# Patient Record
Sex: MALE | Race: WHITE | NOT HISPANIC OR LATINO
[De-identification: names, ages, dates, MRNs, and addresses within clinical notes are randomized per-mention and may not be internally consistent; named-entity substitution may affect disease eponyms.]

---

## 2018-02-13 ENCOUNTER — TRANSCRIPTION ENCOUNTER (OUTPATIENT)
Age: 73
End: 2018-02-13

## 2018-02-13 NOTE — ASU PATIENT PROFILE, ADULT - TOBACCO USE
INDICATION: Left foot injury     



COMPARISON: None



TECHNIQUE: AP, lateral, and oblique views were obtained.



FINDINGS: The bony structures, joint spaces, and soft tissues are normal for age.



IMPRESSION: NEGATIVE EXAMINATION.
Never smoker

## 2018-02-13 NOTE — ASU PATIENT PROFILE, ADULT - PMH
Afib    Atrial flutter    BPH (benign prostatic hyperplasia)    Edema of lower extremity    MR (mental retardation)    Seizure    Varicose veins

## 2018-02-14 ENCOUNTER — OUTPATIENT (OUTPATIENT)
Dept: OUTPATIENT SERVICES | Facility: HOSPITAL | Age: 73
LOS: 1 days | End: 2018-02-14
Payer: MEDICARE

## 2018-02-14 VITALS
TEMPERATURE: 98 F | HEIGHT: 70 IN | DIASTOLIC BLOOD PRESSURE: 69 MMHG | HEART RATE: 81 BPM | OXYGEN SATURATION: 97 % | RESPIRATION RATE: 15 BRPM | SYSTOLIC BLOOD PRESSURE: 124 MMHG | WEIGHT: 179.02 LBS

## 2018-02-14 VITALS
SYSTOLIC BLOOD PRESSURE: 112 MMHG | RESPIRATION RATE: 22 BRPM | HEART RATE: 78 BPM | DIASTOLIC BLOOD PRESSURE: 50 MMHG | OXYGEN SATURATION: 99 %

## 2018-02-14 DIAGNOSIS — H25.11 AGE-RELATED NUCLEAR CATARACT, RIGHT EYE: ICD-10-CM

## 2018-02-14 DIAGNOSIS — Z98.890 OTHER SPECIFIED POSTPROCEDURAL STATES: Chronic | ICD-10-CM

## 2018-02-14 PROCEDURE — V2632: CPT

## 2018-02-14 PROCEDURE — C1889: CPT

## 2018-02-14 PROCEDURE — 66982 XCAPSL CTRC RMVL CPLX WO ECP: CPT | Mod: RT

## 2018-02-20 PROBLEM — I83.90 ASYMPTOMATIC VARICOSE VEINS OF UNSPECIFIED LOWER EXTREMITY: Chronic | Status: ACTIVE | Noted: 2018-02-13

## 2018-02-20 PROBLEM — R56.9 UNSPECIFIED CONVULSIONS: Chronic | Status: ACTIVE | Noted: 2018-02-13

## 2018-02-20 PROBLEM — F79 UNSPECIFIED INTELLECTUAL DISABILITIES: Chronic | Status: ACTIVE | Noted: 2018-02-13

## 2018-02-20 PROBLEM — I48.91 UNSPECIFIED ATRIAL FIBRILLATION: Chronic | Status: ACTIVE | Noted: 2018-02-13

## 2018-02-20 PROBLEM — I48.92 UNSPECIFIED ATRIAL FLUTTER: Chronic | Status: ACTIVE | Noted: 2018-02-13

## 2018-02-20 PROBLEM — R60.0 LOCALIZED EDEMA: Chronic | Status: ACTIVE | Noted: 2018-02-13

## 2018-02-20 PROBLEM — N40.0 BENIGN PROSTATIC HYPERPLASIA WITHOUT LOWER URINARY TRACT SYMPTOMS: Chronic | Status: ACTIVE | Noted: 2018-02-13

## 2018-02-27 ENCOUNTER — TRANSCRIPTION ENCOUNTER (OUTPATIENT)
Age: 73
End: 2018-02-27

## 2018-02-28 ENCOUNTER — OUTPATIENT (OUTPATIENT)
Dept: OUTPATIENT SERVICES | Facility: HOSPITAL | Age: 73
LOS: 1 days | End: 2018-02-28
Payer: MEDICARE

## 2018-02-28 VITALS
SYSTOLIC BLOOD PRESSURE: 114 MMHG | OXYGEN SATURATION: 98 % | WEIGHT: 178.57 LBS | RESPIRATION RATE: 13 BRPM | TEMPERATURE: 98 F | HEART RATE: 78 BPM | HEIGHT: 68 IN | DIASTOLIC BLOOD PRESSURE: 80 MMHG

## 2018-02-28 VITALS
OXYGEN SATURATION: 100 % | SYSTOLIC BLOOD PRESSURE: 110 MMHG | RESPIRATION RATE: 18 BRPM | DIASTOLIC BLOOD PRESSURE: 82 MMHG | HEART RATE: 75 BPM

## 2018-02-28 DIAGNOSIS — H25.11 AGE-RELATED NUCLEAR CATARACT, RIGHT EYE: ICD-10-CM

## 2018-02-28 DIAGNOSIS — H25.12 AGE-RELATED NUCLEAR CATARACT, LEFT EYE: ICD-10-CM

## 2018-02-28 DIAGNOSIS — Z98.41 CATARACT EXTRACTION STATUS, RIGHT EYE: Chronic | ICD-10-CM

## 2018-02-28 DIAGNOSIS — Z98.890 OTHER SPECIFIED POSTPROCEDURAL STATES: Chronic | ICD-10-CM

## 2018-02-28 PROCEDURE — 66982 XCAPSL CTRC RMVL CPLX WO ECP: CPT | Mod: LT

## 2018-02-28 PROCEDURE — V2632: CPT

## 2018-02-28 PROCEDURE — C1889: CPT

## 2018-02-28 NOTE — ASU DISCHARGE PLAN (ADULT/PEDIATRIC). - PT EDUC
Intraocular lens implant (IOL) Eye shield with instructions , sunglasses and eye kit given to patient./Implant card (specify)/other (specify)

## 2018-07-13 ENCOUNTER — EMERGENCY (EMERGENCY)
Facility: HOSPITAL | Age: 73
LOS: 1 days | Discharge: HOME | End: 2018-07-13
Attending: EMERGENCY MEDICINE
Payer: MEDICARE

## 2018-07-13 VITALS
OXYGEN SATURATION: 96 % | TEMPERATURE: 98 F | DIASTOLIC BLOOD PRESSURE: 80 MMHG | HEART RATE: 77 BPM | RESPIRATION RATE: 18 BRPM | SYSTOLIC BLOOD PRESSURE: 122 MMHG

## 2018-07-13 VITALS
DIASTOLIC BLOOD PRESSURE: 84 MMHG | TEMPERATURE: 98 F | WEIGHT: 182.1 LBS | HEART RATE: 84 BPM | HEIGHT: 67 IN | RESPIRATION RATE: 18 BRPM | OXYGEN SATURATION: 95 % | SYSTOLIC BLOOD PRESSURE: 134 MMHG

## 2018-07-13 DIAGNOSIS — Z98.890 OTHER SPECIFIED POSTPROCEDURAL STATES: Chronic | ICD-10-CM

## 2018-07-13 DIAGNOSIS — Z98.41 CATARACT EXTRACTION STATUS, RIGHT EYE: Chronic | ICD-10-CM

## 2018-07-13 LAB
ALBUMIN SERPL ELPH-MCNC: 4.2 G/DL — SIGNIFICANT CHANGE UP (ref 3.3–5)
ALP SERPL-CCNC: 83 U/L — SIGNIFICANT CHANGE UP (ref 40–120)
ALT FLD-CCNC: 17 U/L — SIGNIFICANT CHANGE UP (ref 10–45)
ANION GAP SERPL CALC-SCNC: 13 MMOL/L — SIGNIFICANT CHANGE UP (ref 5–17)
APTT BLD: 33.3 SEC — SIGNIFICANT CHANGE UP (ref 27.5–37.4)
AST SERPL-CCNC: 18 U/L — SIGNIFICANT CHANGE UP (ref 10–40)
BASOPHILS # BLD AUTO: 0 K/UL — SIGNIFICANT CHANGE UP (ref 0–0.2)
BASOPHILS NFR BLD AUTO: 0.7 % — SIGNIFICANT CHANGE UP (ref 0–2)
BILIRUB SERPL-MCNC: 0.3 MG/DL — SIGNIFICANT CHANGE UP (ref 0.2–1.2)
BUN SERPL-MCNC: 23 MG/DL — SIGNIFICANT CHANGE UP (ref 7–23)
CALCIUM SERPL-MCNC: 9.3 MG/DL — SIGNIFICANT CHANGE UP (ref 8.4–10.5)
CHLORIDE SERPL-SCNC: 104 MMOL/L — SIGNIFICANT CHANGE UP (ref 96–108)
CO2 SERPL-SCNC: 23 MMOL/L — SIGNIFICANT CHANGE UP (ref 22–31)
CREAT SERPL-MCNC: 0.97 MG/DL — SIGNIFICANT CHANGE UP (ref 0.5–1.3)
EOSINOPHIL # BLD AUTO: 0 K/UL — SIGNIFICANT CHANGE UP (ref 0–0.5)
EOSINOPHIL NFR BLD AUTO: 0.2 % — SIGNIFICANT CHANGE UP (ref 0–6)
GLUCOSE SERPL-MCNC: 89 MG/DL — SIGNIFICANT CHANGE UP (ref 70–99)
HCT VFR BLD CALC: 44.1 % — SIGNIFICANT CHANGE UP (ref 39–50)
HGB BLD-MCNC: 14.9 G/DL — SIGNIFICANT CHANGE UP (ref 13–17)
INR BLD: 1.15 RATIO — SIGNIFICANT CHANGE UP (ref 0.88–1.16)
LYMPHOCYTES # BLD AUTO: 1.5 K/UL — SIGNIFICANT CHANGE UP (ref 1–3.3)
LYMPHOCYTES # BLD AUTO: 22 % — SIGNIFICANT CHANGE UP (ref 13–44)
MCHC RBC-ENTMCNC: 33.8 GM/DL — SIGNIFICANT CHANGE UP (ref 32–36)
MCHC RBC-ENTMCNC: 34.2 PG — HIGH (ref 27–34)
MCV RBC AUTO: 101 FL — HIGH (ref 80–100)
MONOCYTES # BLD AUTO: 0.6 K/UL — SIGNIFICANT CHANGE UP (ref 0–0.9)
MONOCYTES NFR BLD AUTO: 9.2 % — SIGNIFICANT CHANGE UP (ref 2–14)
NEUTROPHILS # BLD AUTO: 4.5 K/UL — SIGNIFICANT CHANGE UP (ref 1.8–7.4)
NEUTROPHILS NFR BLD AUTO: 67.9 % — SIGNIFICANT CHANGE UP (ref 43–77)
PLATELET # BLD AUTO: 178 K/UL — SIGNIFICANT CHANGE UP (ref 150–400)
POTASSIUM SERPL-MCNC: 4.1 MMOL/L — SIGNIFICANT CHANGE UP (ref 3.5–5.3)
POTASSIUM SERPL-SCNC: 4.1 MMOL/L — SIGNIFICANT CHANGE UP (ref 3.5–5.3)
PROT SERPL-MCNC: 7.1 G/DL — SIGNIFICANT CHANGE UP (ref 6–8.3)
PROTHROM AB SERPL-ACNC: 12.5 SEC — SIGNIFICANT CHANGE UP (ref 9.8–12.7)
RBC # BLD: 4.37 M/UL — SIGNIFICANT CHANGE UP (ref 4.2–5.8)
RBC # FLD: 12.3 % — SIGNIFICANT CHANGE UP (ref 10.3–14.5)
SODIUM SERPL-SCNC: 140 MMOL/L — SIGNIFICANT CHANGE UP (ref 135–145)
WBC # BLD: 6.6 K/UL — SIGNIFICANT CHANGE UP (ref 3.8–10.5)
WBC # FLD AUTO: 6.6 K/UL — SIGNIFICANT CHANGE UP (ref 3.8–10.5)

## 2018-07-13 PROCEDURE — 73590 X-RAY EXAM OF LOWER LEG: CPT

## 2018-07-13 PROCEDURE — 99284 EMERGENCY DEPT VISIT MOD MDM: CPT | Mod: GC

## 2018-07-13 PROCEDURE — 80053 COMPREHEN METABOLIC PANEL: CPT

## 2018-07-13 PROCEDURE — 73590 X-RAY EXAM OF LOWER LEG: CPT | Mod: 26,LT

## 2018-07-13 PROCEDURE — 99284 EMERGENCY DEPT VISIT MOD MDM: CPT | Mod: 25

## 2018-07-13 PROCEDURE — 85027 COMPLETE CBC AUTOMATED: CPT

## 2018-07-13 PROCEDURE — 85610 PROTHROMBIN TIME: CPT

## 2018-07-13 PROCEDURE — 93971 EXTREMITY STUDY: CPT | Mod: 26

## 2018-07-13 PROCEDURE — 73610 X-RAY EXAM OF ANKLE: CPT | Mod: 26,LT

## 2018-07-13 PROCEDURE — 85730 THROMBOPLASTIN TIME PARTIAL: CPT

## 2018-07-13 PROCEDURE — 93971 EXTREMITY STUDY: CPT

## 2018-07-13 PROCEDURE — 73610 X-RAY EXAM OF ANKLE: CPT

## 2018-07-13 RX ORDER — FONDAPARINUX SODIUM 2.5 MG/.5ML
1 INJECTION, SOLUTION SUBCUTANEOUS
Qty: 42 | Refills: 0 | OUTPATIENT
Start: 2018-07-13 | End: 2018-08-02

## 2018-07-13 NOTE — ED PROVIDER NOTE - MEDICAL DECISION MAKING DETAILS
72M PMH developmental delay p/w LLE pain and swelling. Concerning for fx vs DVT. Xrays, doppler. Re-assess.

## 2018-07-13 NOTE — ED PROVIDER NOTE - OBJECTIVE STATEMENT
73yo M h/o seizures on phenytoin, afib on dilatiazem, pradaxa, digoxin, developmental delay sent in for r/o LLE DVT. Per aide, had acute swelling and pain of LLE that started yesterday . Denies f/c, cp/sob, trauma, bites to the area. Has been able to ambulate without issues. Saw PMD who told him to go to [facility] for US. Came here as [facility]'  department was closed until 7am.

## 2018-07-13 NOTE — ED PROVIDER NOTE - PLAN OF CARE
You were found to have a clot in one of the deep veins in your leg. We have switched your anticoagulant to Xarelto from Pradaxa. You need to take one pill two times per day for 21 days with food and then can switch to 20mg once daily with food after 21 weeks. Please see Dr. Bergeron in the next week and then again to get the prescription for the once daily. Please start today at 8pm.  If ou start experiencing shortness of breath, chest pain, worsening swelling, or any other emergent concern please come back to the emergency room. Please take advil 400mg every 6 hours with food for pain and keep the leg elevated.

## 2018-07-13 NOTE — ED PROVIDER NOTE - ATTENDING CONTRIBUTION TO CARE
72 yom hx of known seizure d/o on phenytoin, a fib on dilt and pradaxa longstanding, dig, developmental delay living in a group home in [place], presents for left leg swelling starting yesterday. pt was sent to [facility] initially, however pt and his aide from the group home were told the US department was closed until the AM, so they left and came to [facility] instead. aide states pt sustained a minor ground level fall approx one week ago w abrasions to left knee and shin which pt has not been c/o about. as per aide, LLE swelling to ankle and distal leg started yesterday w pain. no pain to left ankle joint. no f/c. pt unable to provide significant hx.     ROS:   constitutional - no fever, no chills  eyes - no visual changes, no redness  eent - no sore throat, no nasal congestion  cvs - no chest pain, + leg swelling  resp - no shortness of breath, no cough  gi - no abdominal pain, no vomiting, no diarrhea  gu - no dysuria, no hematuria  msk - no acute back pain, no joint swelling  skin - no rashes, no jaundice  neuro - no headache, no focal weakness  psych - no acute mental health issue     Physical Exam:   constitutional - well appearing, awake and alert, oriented x2, is at baseline mental status per aide at the bedside who knows him well. pt is very pleasant, smiling, able to answer yes/ no questions and follow all commands   head - no external evidence of trauma  cvs - rrr, no murmurs, no peripheral edema  resp - breath sounds clear and equal bilat  gi - abdomen soft and nontender, no rigidity, guarding or rebound, bowel sounds present  msk - moving all extremities spontaneously. LLE discolored, 1+ edema, significantly more swollen than right. no ankle joint warmth or erythema - painles rom to joint. healing abrasions to left knee and few to shin - no pain w rom to left knee.   neuro - alert and oriented x2, no focal deficits, CNs 2-12 grossly intact  skin- no jaundice, warm and dry  psych - mood and affect wnl, no apparent risk to self or others     pt found to have distal posterior vein dvt though study not optimal - pt is currently on pradaxa and despite this has acute symptomatic dvt. will attempt to get in touch w pt's cardiologist and/or pmd to discuss plan (poss switch to other noac such as xarelto vs lovenox) for unprovoked dvt already on pradaxa. pt does not have decisional capacity to understand r/b/a of starting/ switching to new AC - will discuss w pt's HCP/brother. endorsed to Dr Pena at 0730 pending discussion w pmd and/or cards as well as HCP. SHUN Pickett MD

## 2018-07-13 NOTE — ED PROVIDER NOTE - CARE PLAN
Principal Discharge DX:	DVT (deep venous thrombosis) Principal Discharge DX:	DVT (deep venous thrombosis)  Assessment and plan of treatment:	You were found to have a clot in one of the deep veins in your leg. We have switched your anticoagulant to Xarelto from Pradaxa. You need to take one pill two times per day for 21 days with food and then can switch to 20mg once daily with food after 21 weeks. Please see Dr. Bergeron in the next week and then again to get the prescription for the once daily. Please start today at 8pm.  If ou start experiencing shortness of breath, chest pain, worsening swelling, or any other emergent concern please come back to the emergency room. Please take advil 400mg every 6 hours with food for pain and keep the leg elevated.

## 2018-07-13 NOTE — ED PROVIDER NOTE - PHYSICAL EXAMINATION
Vitals: WNL  Gen: laying comfortably in NAD  Head: NCAT  ENT: sclerae white, anicterus, moist mucous membranes. No exudates. Neck supple, no LAD,  no carotid bruits, no JVD  CV: RRR. Audible S1 and S2. No murmurs, rubs, gallops, S3, nor S4, 2+ radial and DP pulses   Pulm: Clear to auscultation bilaterally. No wheezes, rales, or rhonchi  Abd: soft, normoactive BS x4, NTND, no rebound, no guarding, no rashes  Musculoskeletal:  swelling left ankle and calf, mild erythema LLE, 2+ pedal pulses, sensation/motor b/l LE intact  Skin: no lesions or scars noted  Neurologic: AAOx3  : no CVA tenderness  Psych: no SI/HI

## 2018-07-13 NOTE — ED PROVIDER NOTE - PROGRESS NOTE DETAILS
Anita: Left message for Dr. Moss [phone]. Will call patient's cardiologist, Dr. Stephens [phone] Anita: Left a message for Dr. Stephens again. Anita: Cardiologist still has not called back. Spoke to Dr. Moss. Will switch to xarelto. She agrees with plan. Also spoke to patient's brother, Zi [phone]. He is aware of the plan and agrees with it.

## 2018-07-13 NOTE — ED ADULT NURSE NOTE - OBJECTIVE STATEMENT
72 y.o M w. PMH of clarissa MATUTE, a-fib came to the ER w. c/o LLE swelling and redness, sent here to r/u for a DVT by PMD. Pt denies any CP, SOB, n/v, fever, chills, h/a, dizziness, weakness. Pt is axox4, lungs CTA, + redness and sweling in LLE, +bs abdomen soft non-distended, +central pulses, ambulating w. steady gait, safety and comfort maintained, no acute distress noted at this time.

## 2018-11-06 NOTE — ASU DISCHARGE PLAN (ADULT/PEDIATRIC). - ITEMS TO FOLLOWUP WITH YOUR PHYSICIAN'S
continue present treatment as per psych plan as reviewed  Medically stable with no new changes in treatment  will continue to monitor medical status while being treated on psych   medically stable for d/c as per psych Eye kit and eye drops. Stable

## 2019-01-04 PROBLEM — Z00.00 ENCOUNTER FOR PREVENTIVE HEALTH EXAMINATION: Status: ACTIVE | Noted: 2019-01-04

## 2019-01-07 ENCOUNTER — APPOINTMENT (OUTPATIENT)
Dept: VASCULAR SURGERY | Facility: CLINIC | Age: 74
End: 2019-01-07

## 2019-01-22 ENCOUNTER — APPOINTMENT (OUTPATIENT)
Dept: VASCULAR SURGERY | Facility: CLINIC | Age: 74
End: 2019-01-22

## 2019-01-22 ENCOUNTER — APPOINTMENT (OUTPATIENT)
Dept: VASCULAR SURGERY | Facility: CLINIC | Age: 74
End: 2019-01-22
Payer: MEDICARE

## 2019-01-22 VITALS
TEMPERATURE: 97.5 F | SYSTOLIC BLOOD PRESSURE: 114 MMHG | BODY MASS INDEX: 28.79 KG/M2 | HEIGHT: 68 IN | DIASTOLIC BLOOD PRESSURE: 77 MMHG | HEART RATE: 72 BPM | WEIGHT: 190 LBS

## 2019-01-22 PROCEDURE — 99203 OFFICE O/P NEW LOW 30 MIN: CPT

## 2019-01-22 PROCEDURE — 93970 EXTREMITY STUDY: CPT

## 2019-01-22 NOTE — ASSESSMENT
[FreeTextEntry1] : In summary, Mr. Guthrie presents with a history of left lower extremity DVT. I have provided him a prescription for medical-grade, knee-high compression stockings. I recommend evaluation by Hematology for hypercoagulable workup for unprovoked DVT. He will return to follow up with me in one year.\par \par Thank you for allowing me to participate in the care of this nice man. Please do not hesitate to call with any questions.\par   \par

## 2019-01-22 NOTE — HISTORY OF PRESENT ILLNESS
[FreeTextEntry1] : I have just had the pleasure of seeing Mr. Ever Guthrie ( 41) in consultation for left lower extremity deep vein thrombosis.\par \par Mr. Guthrie is a 73-year-old gentleman with a history of developmental delay who is accompanied by his aide today. He resides in a group home. History is obtained from the patient, aide, and EMR. \par \par Mr. Guthrie presented to the [facility] ED in July of this year with a one week history of leg edema. A venous duplex demonstrated a left posterior tibial vein DVT. He was on Predaxa for atrial fibrillation at the time, which was switched to Xarelto by his cardiologist Dr. Bergeron. He continues to take Xarelto daily as prescribed. The DVT was deemed to be unprovoked. He continues to experience some left leg edema. There is no known history of skin pigmentation changes, open or healed ulcers, lower extremity claudication, rest pain, or tissue loss.\par \par His medical and surgical history is otherwise significant for seizure disorder, bilateral cataract surgery, right inguinal hernia repair, atrial fibrillation, and BPH\par \par Medications: Lipitor, Digoxin, Diltiazem, Dutasteride, Phenytoin, Flomax, Xarelto, Zonisamide\par \par Allergies: NKDA\par \par Social history: Non-smoker\par \par FH: NC\par

## 2019-01-22 NOTE — PHYSICAL EXAM
[de-identified] : On physical examination the patient is NAD, NCAT. HEENT- WNL. The lungs are clear and the heart has an irregular rate and rhythm. The abdomen is soft, without tenderness or pulsatile mass. Non-pitting left leg edema. No open wounds. Decreased pedal pulses.\par \par A lower extremity venous duplex ultrasound was obtained in the office today, which showed:\par -No evidence of DVT, SVT\par

## 2019-01-28 ENCOUNTER — APPOINTMENT (OUTPATIENT)
Dept: VASCULAR SURGERY | Facility: CLINIC | Age: 74
End: 2019-01-28

## 2019-02-19 ENCOUNTER — EMERGENCY (EMERGENCY)
Facility: HOSPITAL | Age: 74
LOS: 1 days | Discharge: HOME | End: 2019-02-19
Attending: EMERGENCY MEDICINE
Payer: MEDICARE

## 2019-02-19 VITALS
DIASTOLIC BLOOD PRESSURE: 79 MMHG | OXYGEN SATURATION: 100 % | RESPIRATION RATE: 17 BRPM | HEART RATE: 78 BPM | TEMPERATURE: 98 F | SYSTOLIC BLOOD PRESSURE: 128 MMHG

## 2019-02-19 VITALS
TEMPERATURE: 98 F | OXYGEN SATURATION: 97 % | DIASTOLIC BLOOD PRESSURE: 69 MMHG | RESPIRATION RATE: 18 BRPM | SYSTOLIC BLOOD PRESSURE: 110 MMHG | HEART RATE: 87 BPM

## 2019-02-19 DIAGNOSIS — Z98.41 CATARACT EXTRACTION STATUS, RIGHT EYE: Chronic | ICD-10-CM

## 2019-02-19 DIAGNOSIS — Z98.890 OTHER SPECIFIED POSTPROCEDURAL STATES: Chronic | ICD-10-CM

## 2019-02-19 PROCEDURE — 70486 CT MAXILLOFACIAL W/O DYE: CPT | Mod: 26

## 2019-02-19 PROCEDURE — 82962 GLUCOSE BLOOD TEST: CPT

## 2019-02-19 PROCEDURE — 76377 3D RENDER W/INTRP POSTPROCES: CPT

## 2019-02-19 PROCEDURE — 76377 3D RENDER W/INTRP POSTPROCES: CPT | Mod: 26

## 2019-02-19 PROCEDURE — 12013 RPR F/E/E/N/L/M 2.6-5.0 CM: CPT

## 2019-02-19 PROCEDURE — 90471 IMMUNIZATION ADMIN: CPT

## 2019-02-19 PROCEDURE — 99285 EMERGENCY DEPT VISIT HI MDM: CPT | Mod: 25

## 2019-02-19 PROCEDURE — 99284 EMERGENCY DEPT VISIT MOD MDM: CPT | Mod: 25,GC

## 2019-02-19 PROCEDURE — 70450 CT HEAD/BRAIN W/O DYE: CPT | Mod: 26

## 2019-02-19 PROCEDURE — 70450 CT HEAD/BRAIN W/O DYE: CPT

## 2019-02-19 PROCEDURE — 70486 CT MAXILLOFACIAL W/O DYE: CPT

## 2019-02-19 PROCEDURE — 90715 TDAP VACCINE 7 YRS/> IM: CPT

## 2019-02-19 PROCEDURE — 12013 RPR F/E/E/N/L/M 2.6-5.0 CM: CPT | Mod: GC

## 2019-02-19 RX ORDER — ACETAMINOPHEN 500 MG
2 TABLET ORAL
Qty: 40 | Refills: 0 | OUTPATIENT
Start: 2019-02-19 | End: 2019-02-23

## 2019-02-19 RX ORDER — ACETAMINOPHEN 500 MG
975 TABLET ORAL ONCE
Qty: 0 | Refills: 0 | Status: COMPLETED | OUTPATIENT
Start: 2019-02-19 | End: 2019-02-19

## 2019-02-19 RX ORDER — TETANUS TOXOID, REDUCED DIPHTHERIA TOXOID AND ACELLULAR PERTUSSIS VACCINE, ADSORBED 5; 2.5; 8; 8; 2.5 [IU]/.5ML; [IU]/.5ML; UG/.5ML; UG/.5ML; UG/.5ML
0.5 SUSPENSION INTRAMUSCULAR ONCE
Qty: 0 | Refills: 0 | Status: COMPLETED | OUTPATIENT
Start: 2019-02-19 | End: 2019-02-19

## 2019-02-19 RX ADMIN — Medication 975 MILLIGRAM(S): at 16:15

## 2019-02-19 RX ADMIN — Medication 1 TABLET(S): at 14:10

## 2019-02-19 RX ADMIN — TETANUS TOXOID, REDUCED DIPHTHERIA TOXOID AND ACELLULAR PERTUSSIS VACCINE, ADSORBED 0.5 MILLILITER(S): 5; 2.5; 8; 8; 2.5 SUSPENSION INTRAMUSCULAR at 13:10

## 2019-02-19 NOTE — ED PROVIDER NOTE - ATTENDING CONTRIBUTION TO CARE
Patient s/p mechanical fall with facial injury, no LOC.  On Xarelto.  Reporting pain in nose.  No other complaints.  On exam patient well appearing, at baseline mental status per aide, small laceration to R eyebrow and over nasal bridge, neuro exam grossly intact - plan for screening CT head/maxillofacial for injury.

## 2019-02-19 NOTE — ED PROVIDER NOTE - CARE PROVIDER_API CALL
Bisi Mahan (MD)  Plastic Surgery  [address] [place] [address], [address]  [place], [place] [id number]  Phone: [phone]  Fax: [phone]  Follow Up Time:

## 2019-02-19 NOTE — ED PROVIDER NOTE - PHYSICAL EXAMINATION
Resident:   Gen: well appearing, of stated age, no acute distress  Head: NC, AT  ENT: PERRL, MMM  Neck: supple with full ROM   Chest: CTAB, no retractions, rate normal, appears to breathe comfortably  Heart: RRR S1S2, No peripheral edema, bilateral pulses in arms and legs  Abd: Soft non-tender, no rebound or guarding  Back: No spinal deformity, no CVAT  Ext: Moving all 4 extremities without obvious impairment to ROM, no obvious weakness  Neuro: fluid speech, no focal deficits, normal sensation  Psych: repetitive speech  Skin: 2cm linear laceration over right eyebrow, oozing; 1cm linear laceration over nasal bridge, oozing.

## 2019-02-19 NOTE — ED PROVIDER NOTE - CLINICAL SUMMARY MEDICAL DECISION MAKING FREE TEXT BOX
Resident: 73y M with MR, seizures, Afib on anticoagulation presents with witnessed mechanical fall. No seizure activity reported. vitals wnl. non-focal exam. Will obtain CT head, CT maxface, repair lacerations.

## 2019-02-19 NOTE — ED PROVIDER NOTE - PROGRESS NOTE DETAILS
lacerations repaired. Will dc home with wound care instructions, nasal fracture instructions, plastics follow-up, augmentin.

## 2019-02-19 NOTE — ED PROCEDURE NOTE - PROCEDURE ADDITIONAL DETAILS
3, 5.0 Ethilon simple interrupted sutures applied to R eye brow laceration.    4, 5.0 Ethilon simple interrupted sutures applied to nasal bridge laceration.    Wound anesthetized with 1% lidocaine. Copious Irrigation without evidence of foreign body.

## 2019-02-19 NOTE — ED PROVIDER NOTE - NSFOLLOWUPINSTRUCTIONS_ED_ALL_ED_FT
1. nasal fracture, lacerations  2. Take augmentin as directed. Do not blow your nose. It is normal to have a small amount of blood drain from nose, and to have red tinged tears. Keep sutured wounds clean and dry for 24hours, then treat the area as regular skin. Apply a small amount of bacitracin to the area 1-2times per day.   3. Return to the ED or your primary care doctor for suture removal in 5 days. Follow-up with plastic surgery in 3-7 days for nasal fracture.  4. Return immediately to the emergency department if any worsening or concerning symptoms. 1. nasal fracture, lacerations  2. Take augmentin as directed. Do not blow your nose. It is normal to have a small amount of blood drain from nose, and to have red tinged tears. Keep sutured wounds clean and dry for 24hours, then treat the area as regular skin. Apply a small amount of bacitracin to the area 1-2times per day.   3. Return to the ED or your primary care doctor for suture removal in 5 days - we have provided a suture removal kit for this use. Follow-up with plastic surgery in 3-7 days for nasal fracture.  4. Return immediately to the emergency department if any worsening or concerning symptoms.

## 2019-02-19 NOTE — ED ADULT NURSE REASSESSMENT NOTE - NS ED NURSE REASSESS COMMENT FT1
Pt aware we are waiting for CT results. No active bleeding from laceration currently. Safety and comfort measures provided. Home health Aide at bedside. Side rails up for safety.

## 2019-02-19 NOTE — ED ADULT NURSE NOTE - INTERVENTIONS DEFINITIONS
Monitor for mental status changes and reorient to person, place, and time/Physically safe environment: no spills, clutter or unnecessary equipment/[place] to call system

## 2019-02-19 NOTE — ED PROVIDER NOTE - OBJECTIVE STATEMENT
Resident: 73y M PMH MR, seizures, Afib on xarelto presents with witnessed ground level fall. Accompanied by aide. Patient was ambulating in [employer] with his aide when he "tripped over his feet," hitting his head. Per the aide, patient did not lose consciousness, was able to sit up after falling but has not walked since falling. Per patient, denies preceding dizziness or lightheadedness.

## 2019-02-19 NOTE — ED ADULT NURSE REASSESSMENT NOTE - NS ED NURSE REASSESS COMMENT FT1
Pupils 3mm PERRL. Equal strength and sensation in all extremities. Neuro reassessment documented in trauma flow sheet found in the chart. Pt reporting slight pain but due to developmental delay pt unable to give valid pain scale. Pt appears comfortable. MD aware. Safety and comfort measures provided. Bed in lowest position. Home health aide at bedside.

## 2019-02-19 NOTE — ED ADULT NURSE REASSESSMENT NOTE - NS ED NURSE REASSESS COMMENT FT1
Pt reports blood drainage from eye. MD at bedside educating pt on fracture in the nose that could cause blood to excrete slightly from the eye socket. Pt and Aide educated and aware of plan of care. Safety and comfort measures provided. Pt denies any pain.

## 2019-05-04 ENCOUNTER — EMERGENCY (EMERGENCY)
Facility: HOSPITAL | Age: 74
LOS: 1 days | Discharge: HOME | End: 2019-05-04
Attending: EMERGENCY MEDICINE
Payer: MEDICARE

## 2019-05-04 VITALS
SYSTOLIC BLOOD PRESSURE: 126 MMHG | OXYGEN SATURATION: 98 % | TEMPERATURE: 98 F | RESPIRATION RATE: 16 BRPM | DIASTOLIC BLOOD PRESSURE: 78 MMHG | WEIGHT: 190.04 LBS | HEART RATE: 77 BPM

## 2019-05-04 DIAGNOSIS — Z98.41 CATARACT EXTRACTION STATUS, RIGHT EYE: Chronic | ICD-10-CM

## 2019-05-04 DIAGNOSIS — Z98.890 OTHER SPECIFIED POSTPROCEDURAL STATES: Chronic | ICD-10-CM

## 2019-05-04 PROCEDURE — 72125 CT NECK SPINE W/O DYE: CPT | Mod: 26

## 2019-05-04 PROCEDURE — 12011 RPR F/E/E/N/L/M 2.5 CM/<: CPT

## 2019-05-04 PROCEDURE — 70450 CT HEAD/BRAIN W/O DYE: CPT | Mod: 26

## 2019-05-04 PROCEDURE — 70450 CT HEAD/BRAIN W/O DYE: CPT

## 2019-05-04 PROCEDURE — 99284 EMERGENCY DEPT VISIT MOD MDM: CPT | Mod: 25

## 2019-05-04 PROCEDURE — 72125 CT NECK SPINE W/O DYE: CPT

## 2019-05-04 NOTE — ED PROVIDER NOTE - PHYSICAL EXAMINATION
Attending note. Patient is alert and in no acute distress. Patient has a 1 cm laceration above the left brow. It was a 3 mm equal reactive. Neck and spine are nontender. Chest nontender. Abdomen is soft and nontender. Pelvis is nontender. Patient is moving all extremities without pain or tenderness. There appears to be no focal neurologic deficit.

## 2019-05-04 NOTE — ED PROVIDER NOTE - CLINICAL SUMMARY MEDICAL DECISION MAKING FREE TEXT BOX
Attending note. She will fall with left forehead laceration. CT scan to rule out bleed. Suture repair.

## 2019-05-04 NOTE — ED PROVIDER NOTE - OBJECTIVE STATEMENT
73yom w/ afib on xarelto, developmental delay and unable to provide accurate history had a witnessed fall at the group home today, believed to have tripped over linen and fell onto his face. No LOC reported, has been ambulatory since. Denies any pain. At his baseline mental status per aide at bedside. Tetanus updated 2/2019. 73yom w/ afib on xarelto, developmental delay and unable to provide accurate history had a witnessed fall at the group home today, believed to have tripped over linen and fell onto his face. No LOC reported, has been ambulatory since. Denies any pain. At his baseline mental status per aide at bedside. Tetanus updated 2/2019.      Attending note. Patient was seen in fast track room #2. Agree with the above. Patient tripped and fell striking his left forehead on the ground and sustained laceration to the left forehead. Patient denies complaint. Patient unable to give reliable history. He denies any other complaints. He has no allergies.

## 2019-05-04 NOTE — ED PROVIDER NOTE - NSFOLLOWUPINSTRUCTIONS_ED_ALL_ED_FT
You were seen in the ER after a fall. CT scan of the head and neck did not reveal any acute injury. A laceration of the face was repaired. Absorbable sutures were placed. These will break down and fall out over time. Keep the applied steristrips clean and dry for at least 48 hours. After that, gently cleanse around the wound but do not vigorously scrub. Monitor for any redness, swelling, warmth, discharge.

## 2019-05-04 NOTE — ED ADULT TRIAGE NOTE - CHIEF COMPLAINT QUOTE
Trip and fall from standing, hit forehead, L eyebrow lac  No LOC Trip and fall from standing, hit forehead, L eyebrow lac.  No LOC.  Hx Epilepsy. Takes Xarelto for A.fib.

## 2019-05-04 NOTE — ED PROVIDER NOTE - SKIN, MLM
Skin normal color for race, warm, dry. 1cm linear laceration to the forehead over the left eye brow, no active bleeding

## 2019-05-04 NOTE — ED ADULT NURSE NOTE - NSIMPLEMENTINTERV_GEN_ALL_ED
Implemented All Fall with Harm Risk Interventions:  [place] to call system. Call bell, personal items and telephone within reach. Instruct patient to call for assistance. Room bathroom lighting operational. Non-slip footwear when patient is off stretcher. Physically safe environment: no spills, clutter or unnecessary equipment. Stretcher in lowest position, wheels locked, appropriate side rails in place. Provide visual cue, wrist band, yellow gown, etc. Monitor gait and stability. Monitor for mental status changes and reorient to person, place, and time. Review medications for side effects contributing to fall risk. Reinforce activity limits and safety measures with patient and family. Provide visual clues: red socks.

## 2019-05-04 NOTE — ED ADULT NURSE NOTE - CHIEF COMPLAINT QUOTE
Trip and fall from standing, hit forehead, L eyebrow lac.  No LOC.  Hx Epilepsy. Takes Xarelto for A.fib.

## 2019-05-04 NOTE — ED ADULT NURSE NOTE - OBJECTIVE STATEMENT
73 year old male at baseline mental status, PMHX of A-fib on Xarelto, Seizure disorder, MR, presents with a mechanical fall this morning with a laceration to the left forehead. Patient accompanied by aid who states patient trip and fell landing on the forehead and did not lose consciousness. Patient's pupils perrla 3mm bilaterally. Patient has equal strength and sensation bilaterally. Patient ambulates without ataxia. Patient unable to answer pertinent positive/negatives at baseline.

## 2019-05-04 NOTE — ED PROVIDER NOTE - ENMT, MLM
Airway patent, Nasal mucosa clear. Mouth with normal mucosa. No facial bone deformities or tenderness

## 2019-05-09 ENCOUNTER — TRANSCRIPTION ENCOUNTER (OUTPATIENT)
Age: 74
End: 2019-05-09

## 2019-07-22 ENCOUNTER — TRANSCRIPTION ENCOUNTER (OUTPATIENT)
Age: 74
End: 2019-07-22

## 2019-09-11 ENCOUNTER — TRANSCRIPTION ENCOUNTER (OUTPATIENT)
Age: 74
End: 2019-09-11

## 2019-10-17 ENCOUNTER — EMERGENCY (EMERGENCY)
Facility: HOSPITAL | Age: 74
LOS: 1 days | End: 2019-10-17
Attending: EMERGENCY MEDICINE
Payer: MEDICARE

## 2019-10-17 VITALS
WEIGHT: 179.9 LBS | TEMPERATURE: 97 F | RESPIRATION RATE: 18 BRPM | HEART RATE: 75 BPM | HEIGHT: 66 IN | DIASTOLIC BLOOD PRESSURE: 78 MMHG | SYSTOLIC BLOOD PRESSURE: 117 MMHG | OXYGEN SATURATION: 100 %

## 2019-10-17 DIAGNOSIS — Z98.890 OTHER SPECIFIED POSTPROCEDURAL STATES: Chronic | ICD-10-CM

## 2019-10-17 DIAGNOSIS — Z98.41 CATARACT EXTRACTION STATUS, RIGHT EYE: Chronic | ICD-10-CM

## 2019-10-17 PROCEDURE — 99284 EMERGENCY DEPT VISIT MOD MDM: CPT | Mod: GC

## 2019-10-17 NOTE — ED ADULT TRIAGE NOTE - CHIEF COMPLAINT QUOTE
Left lower extremity swelling, no pain reported. Pt has hx of DVT, History aided by staff member of home where pt resides

## 2019-10-18 VITALS
TEMPERATURE: 98 F | HEART RATE: 76 BPM | OXYGEN SATURATION: 99 % | DIASTOLIC BLOOD PRESSURE: 78 MMHG | RESPIRATION RATE: 16 BRPM | SYSTOLIC BLOOD PRESSURE: 127 MMHG

## 2019-10-18 LAB
ALBUMIN SERPL ELPH-MCNC: 4.3 G/DL — SIGNIFICANT CHANGE UP (ref 3.3–5)
ALP SERPL-CCNC: 74 U/L — SIGNIFICANT CHANGE UP (ref 40–120)
ALT FLD-CCNC: 24 U/L — SIGNIFICANT CHANGE UP (ref 10–45)
ANION GAP SERPL CALC-SCNC: 12 MMOL/L — SIGNIFICANT CHANGE UP (ref 5–17)
APTT BLD: 34 SEC — SIGNIFICANT CHANGE UP (ref 27.5–36.3)
AST SERPL-CCNC: 18 U/L — SIGNIFICANT CHANGE UP (ref 10–40)
BASOPHILS # BLD AUTO: 0.01 K/UL — SIGNIFICANT CHANGE UP (ref 0–0.2)
BASOPHILS NFR BLD AUTO: 0.2 % — SIGNIFICANT CHANGE UP (ref 0–2)
BILIRUB SERPL-MCNC: 0.2 MG/DL — SIGNIFICANT CHANGE UP (ref 0.2–1.2)
BUN SERPL-MCNC: 24 MG/DL — HIGH (ref 7–23)
CALCIUM SERPL-MCNC: 9.8 MG/DL — SIGNIFICANT CHANGE UP (ref 8.4–10.5)
CHLORIDE SERPL-SCNC: 106 MMOL/L — SIGNIFICANT CHANGE UP (ref 96–108)
CO2 SERPL-SCNC: 21 MMOL/L — LOW (ref 22–31)
CREAT SERPL-MCNC: 0.79 MG/DL — SIGNIFICANT CHANGE UP (ref 0.5–1.3)
EOSINOPHIL # BLD AUTO: 0 K/UL — SIGNIFICANT CHANGE UP (ref 0–0.5)
EOSINOPHIL NFR BLD AUTO: 0 % — SIGNIFICANT CHANGE UP (ref 0–6)
GLUCOSE SERPL-MCNC: 103 MG/DL — HIGH (ref 70–99)
HCT VFR BLD CALC: 39.6 % — SIGNIFICANT CHANGE UP (ref 39–50)
HGB BLD-MCNC: 13.2 G/DL — SIGNIFICANT CHANGE UP (ref 13–17)
INR BLD: 1.22 RATIO — HIGH (ref 0.88–1.16)
LYMPHOCYTES # BLD AUTO: 1.3 K/UL — SIGNIFICANT CHANGE UP (ref 1–3.3)
LYMPHOCYTES # BLD AUTO: 31.5 % — SIGNIFICANT CHANGE UP (ref 13–44)
MCHC RBC-ENTMCNC: 33.3 GM/DL — SIGNIFICANT CHANGE UP (ref 32–36)
MCHC RBC-ENTMCNC: 33.3 PG — SIGNIFICANT CHANGE UP (ref 27–34)
MCV RBC AUTO: 100 FL — SIGNIFICANT CHANGE UP (ref 80–100)
MONOCYTES # BLD AUTO: 0.47 K/UL — SIGNIFICANT CHANGE UP (ref 0–0.9)
MONOCYTES NFR BLD AUTO: 11.4 % — SIGNIFICANT CHANGE UP (ref 2–14)
NEUTROPHILS # BLD AUTO: 2.35 K/UL — SIGNIFICANT CHANGE UP (ref 1.8–7.4)
NEUTROPHILS NFR BLD AUTO: 56.9 % — SIGNIFICANT CHANGE UP (ref 43–77)
NRBC # BLD: 0 /100 WBCS — SIGNIFICANT CHANGE UP (ref 0–0)
PLATELET # BLD AUTO: 189 K/UL — SIGNIFICANT CHANGE UP (ref 150–400)
POTASSIUM SERPL-MCNC: 4.5 MMOL/L — SIGNIFICANT CHANGE UP (ref 3.5–5.3)
POTASSIUM SERPL-SCNC: 4.5 MMOL/L — SIGNIFICANT CHANGE UP (ref 3.5–5.3)
PROT SERPL-MCNC: 6.7 G/DL — SIGNIFICANT CHANGE UP (ref 6–8.3)
PROTHROM AB SERPL-ACNC: 14 SEC — HIGH (ref 10–12.9)
RBC # BLD: 3.96 M/UL — LOW (ref 4.2–5.8)
RBC # FLD: 13.5 % — SIGNIFICANT CHANGE UP (ref 10.3–14.5)
SODIUM SERPL-SCNC: 139 MMOL/L — SIGNIFICANT CHANGE UP (ref 135–145)
WBC # BLD: 4.13 K/UL — SIGNIFICANT CHANGE UP (ref 3.8–10.5)
WBC # FLD AUTO: 4.13 K/UL — SIGNIFICANT CHANGE UP (ref 3.8–10.5)

## 2019-10-18 PROCEDURE — 85027 COMPLETE CBC AUTOMATED: CPT

## 2019-10-18 PROCEDURE — 99284 EMERGENCY DEPT VISIT MOD MDM: CPT | Mod: 25

## 2019-10-18 PROCEDURE — 85610 PROTHROMBIN TIME: CPT

## 2019-10-18 PROCEDURE — 80053 COMPREHEN METABOLIC PANEL: CPT

## 2019-10-18 PROCEDURE — 93971 EXTREMITY STUDY: CPT | Mod: 26

## 2019-10-18 PROCEDURE — 93971 EXTREMITY STUDY: CPT

## 2019-10-18 PROCEDURE — 85730 THROMBOPLASTIN TIME PARTIAL: CPT

## 2019-10-18 NOTE — ED PROVIDER NOTE - NS ED ROS FT
GENERAL: no fever, no chills  HEENT: no trouble swallowing or speaking  CARDIAC: no chest pain  PULMONARY: no cough, no shortness of breath  GI: no abdominal pain, no nausea, no vomiting, no diarrhea, no constipation  : no changes in urination  SKIN: no rashes  NEURO: no headache, no numbness, no weakness  MSK: no muscle pain, no back pain, no calf pain    ROS limited 2/2 patient's capacity, augmented w/ hx from sheri Armando, PGY-1

## 2019-10-18 NOTE — ED PROVIDER NOTE - PROGRESS NOTE DETAILS
DH: Labs reviewed and non-actionable, US negative for DVT. Patient seen and evaluated. NAD, VSS, alert to baseline. Discussed results and plan w/ patient and caregiver, all questions answered. Patient given return precautions. Stable for d/c.

## 2019-10-18 NOTE — ED ADULT NURSE NOTE - NSIMPLEMENTINTERV_GEN_ALL_ED
Implemented All Fall with Harm Risk Interventions:  [place] to call system. Call bell, personal items and telephone within reach. Instruct patient to call for assistance. Room bathroom lighting operational. Non-slip footwear when patient is off stretcher. Physically safe environment: no spills, clutter or unnecessary equipment. Stretcher in lowest position, wheels locked, appropriate side rails in place. Provide visual cue, wrist band, yellow gown, etc. Monitor gait and stability. Monitor for mental status changes and reorient to person, place, and time. Review medications for side effects contributing to fall risk. Reinforce activity limits and safety measures with patient and family. Provide visual clues: red socks.

## 2019-10-18 NOTE — ED PROVIDER NOTE - PHYSICAL EXAMINATION
GENERAL: non-toxic appearing, no acute distress  HEENT: NCAT, EOMI, oral mucosa moist, normal conjunctiva  RESP: CTAB, no respiratory distress, no wheezes/rhonchi/rales, speaking in full sentences  CV: no murmurs/rubs/gallops, LLE edema > RLE w/o pitting or tenderness  ABDOMEN: soft, non-tender, non-distended, no guarding  MSK: no visible deformities  NEURO: no focal sensory or motor deficits  SKIN: warm, normal color, well perfused, chronic vascular changes of LLE w/o erythema  PSYCH: normal affect    -Sean Armando, PGY-1

## 2019-10-18 NOTE — ED PROVIDER NOTE - NSFOLLOWUPINSTRUCTIONS_ED_ALL_ED_FT
1. Please follow up with your primary care provider in 48-72 hours.  2. Continue taking all medications as prescribed.   3. Return to the ER for any new or concerning symptoms.

## 2019-10-18 NOTE — ED ADULT NURSE NOTE - OBJECTIVE STATEMENT
74 year old male presents to Ed via walk-in complaining of left leg swelling. pmh of DVT, a-fib,  and epilepsy. Aide at bedside from assisted living home reports pt is on autistic spectrum and has difficulty expressing pain. Upon palpation of left leg patient not grimacing or expressing s/s of pain. Aide reports swelling began one week ago. Upon assessment left leg warm, shiny, reddish-brown, and swollen in comparison to right leg. pt is a&o x3 but anxious and agitated. aide reports this is consistent with baseline mentation. no SOB noted. pt denies chest pain, n/v/d, fever and chills. safety and comfort measures maintained.

## 2019-10-18 NOTE — ED PROVIDER NOTE - ATTENDING CONTRIBUTION TO CARE
MD Nguyễn:  patient seen and evaluated personally.   I agree with the History & Physical,  Impression & Plan other than what was detailed in my note.  MD Nguyễn      23 y/o f , healthy, no pmh, no fam hx of heart disease, no one  at young age, presenting w/ palpitations that started earlier today. has had them before in the past, sometimes having lightheadedness but no syncope. intermittentl left sided headache over past 2 weeks. was taking motrin, not taking anything else. no hx of dvt/pe, no leg swelling, not having sob, no ocps', no hemoptysis. afebrile vitals stable, exam normal, heart sounds regular, pulse regular, mildly dry appearing. neg crooks/homans. given pt has no symptoms at this time pe seems very unlikely. will check electrolytes keep on monitor. egk shows sinus w/ sinus arrythmia which could explain symptoms. intervals normal, no s/o wpw, hocm, brugada. give iv fluids. pt currenty menstruating MD Nguyễn:  patient seen and evaluated personally.   I agree with the History & Physical,  Impression & Plan other than what was detailed in my note.  MD Nguyễn      pt w/ hx of dvt on xarelto, mr, unable to provide full hx, brought in for two days of leg swelling. no evidence of cellulitis on exam, pos swelling but non tender. will get labs, us to screen for  blood clot. no report of cp, sob. palp.

## 2019-10-18 NOTE — ED ADULT NURSE NOTE - CAS TRG GENERAL NORM CIRC DET
PT Evaluation   Today's date: 10/14/2019  Patient name: Anjum Fox  : 1944  MRN: [id number]  Referring provider: Kala Lorenz DO  Dx:   Encounter Diagnosis     ICD-10-CM    1  Other spondylosis, lumbar region M47 896    2  Acute bilateral low back pain without sciatica M54 5      Assessment  Assessment details: Patient reported low back pain and difficulty after standing  Impairments: abnormal or restricted ROM, abnormal movement, activity intolerance, impaired balance, impaired physical strength, pain with function and safety issue  Understanding of Dx/Px/POC: excellent   Prognosis: fair    Goals  STG  2-4 weeks:   Increase lumbar spine AROM by 2-4 degrees  Increase lower extremity strength by 2-4 lbs in all weak areas  Improve sitting posture and body mechanics  Increase standing/ADL tolerance minutes  Decrease pain by 25-50% with standing, walking, and stairs  Initiate HEP  LTG  6-8 weeks:   Demonstrate normal lumbar rotation  Decrease pain to 1-2/10 with activity  Increase lower extremity strength by 10-20 lbs in all weak areas  Improve spinal stability  Improve gait pattern and balance  Decrease limitations with standing, walking and ADL's  DC with HEP      Plan  Patient would benefit from: PT eval and skilled physical therapy  Planned modality interventions: TENS and unattended electrical stimulation  Planned therapy interventions: joint mobilization, manual therapy, neuromuscular re-education, patient education, postural training, self care, strengthening, stretching, therapeutic activities, therapeutic exercise, therapeutic training, transfer training, home exercise program, graded exercise, gait training, flexibility, coordination, balance and balance/weight bearing training  Frequency: 3x week  Duration in weeks: 6  Treatment plan discussed with: patient      Subjective Evaluation    Pain  Quality: discomfort, knife-like, pulling, squeezing, sharp, radiating and tight  Relieving factors: heat, change in position, relaxation and rest  Aggravating factors: lifting, running, stair climbing, walking and standing  Progression: worsening    Treatments  Current treatment: physical therapy  Patient Goals  Patient goals for therapy: decreased pain, improved balance, increased motion, return to work, return to [employer] activities, independence with ADLs/IADLs and increased strength        Objective    Date of onset:  10/1/2019    Date of Surgery:  None Recent    History of Present Episode: 10/14/2019  Marissa Boggs states he has had back issues since 1996  He received surgery in 1996 and has had multiple acute episodes since  He does report falling down an embankment this summer and that flared up his back  Past Medical History:    10/14/2019  Felix reports lumbar back surgery in 1996  Heart issues  Type 2 diabetes  Heart valve surgery  Heart by pass SX 4x  Left TKR SX     Previous Level of Functional Ability:  10/14/2019  Felix states his ability to stand and walk has become more and more of a challenge  His back is just getting worse and worse  Inspection / Palpation:  Lumbar:  10/14/2019  Mesomorphic body type  No signs of infection  No signs of wounds  No signs of drainage  No signs of ecchymotic regions  No signs of erythremic regions  Moderate signs of muscle spasm  Moderate signs of muscle guarding  Mild to moderate signs of tenderness reported to palpation  No signs of swelling  Positive signs of a surgery site  Current conditions appears consistent with recent acute episode  Chief Complaints:  10/14/2019  Felix reports mild to severe difficulty with standing  Marissa Boggs reports moderate to severe difficulty with walking  Marissa Boggs reports moderate to severe difficulty with movement / use of his lumbar region  Marissa Boggs reports moderate to severe difficulty with use of stairs  Marissa Boggs reports severe difficulty with running    Felix reports severe difficulty with jumping  Ji Roque reports moderate to severe difficulty with use of inclines  Ji Roque reports mild to moderate difficulty with sleeping  Ji Roque reports mild to moderate difficulty with his strength and endurance  Ji Roque reports moderate limitations with his range of motion  Ji Roque reports mild difficulty lying on his lumbar region  Felix reports moderate difficulty twisting / turning his lumbar region      LUMBAR PAIN     Resting Palpation Bending Extending Walking Lifting   10/14/2019 0-4 2-6 3-6 3-6 3-9 4-9     LUMBAR PAIN     Pulling Pushing Sleeping Twisting Standing   10/14/2019 3-7 3-7 2-4 4-8 3-9     LUMBAR AROM Flexion Extension Rotation Right   10/14/2019 65° 5° 45°     LUMBAR AROM Rotation Left Side Bending Right Side Bending Left   10/14/2019 45° 35° 35°     LUMBAR MMT Flexion Extension Rotation Right   10/14/2019 2/10  22 lbs 3/10  27 lbs 4/10  21 lbs     LUMBAR MMT Rotation Left Side Bend Right Side Bend Left   10/14/2019 3/10  22 lbs 3/10  23 lbs 3/10  22 lbs     LE MMT Dorsiflexion Plantarflexion Knee flexion Knee extension   10/14/2019 Rt 0/10  25 lbs 0/10  28 lbs 0/10  24 lbs 0/10  25 lbs   10/14/2019 Lt 0/10  32 lbs 0/10  35 lbs 0/10  20 lbs 0/10  19 lbs     LE MMT Hip Flexion Hip Abduction Hip Adduction   10/14/2019  Rt 0/10  18 lbs 0/10  21 lbs 0/10  20 lbs   10/14/2019  Lt 0/10  19 lbs 0/10  22 lbs 0/10  21 lbs     LUMBAR SCREEN Referred Pain Sacroiliac Joint test Squish Test Straight Leg  Raise   10/14/2019 Rt Negative Negative Negative Negative   10/14/2019 Lt Negative Negative Negative Negative     LUMBAR SCREEN Valsalva Gapping Bowstring sign Hip Bursitis   10/14/2019 Rt Negative Negative Negative Negative   10/14/2019 Lt Negative Negative Negative Negative     Precautions:  Low Back Pain    Daily Treatment Log  Manual  10/14       MT, ROM 15'       HEP 15'       Exercise Log 10/14       Balance Board 30x       Chair Squats        BOSU-Walk 5'       Foam Pad SLR,Hip/KneeFl,Step Ups 30x       Foam Beam 30x       P-Bar-GT-Forward, Backward,Side-Even & Dips        Monster Steps        Fitter-Slalom        T/G-Squats,PF 30x       W/P-PNF,IR,ER,PU,PS:Top,Mid,Bot 10#-30x       Trimax-BP,TKE        Sual-BP,Lats,PD,Row 35#-30x       Dwlnqfz-UI-Cc 2x2'       NK Table 10#-30x       TM        UBC 10'       Bike 10'       Stepper        ME, PE 15'               Modalities 10/14       MH&ES 20'       US Strong peripheral pulses

## 2019-10-18 NOTE — ED PROVIDER NOTE - PATIENT PORTAL LINK FT
You can access the FollowMyHealth Patient Portal offered by [facility] by registering at the following website: [email or web address]. By joining [facility]’s FollowMyHealth portal, you will also be able to view your health information using other applications (apps) compatible with our system.

## 2019-10-18 NOTE — ED PROVIDER NOTE - CLINICAL SUMMARY MEDICAL DECISION MAKING FREE TEXT BOX
74 year old male p/w LLE swelling x2 days, sent in by PCP for concern of DVT, patient on Xarelto. LLE swelling w/o pitting or erythema unlikely cellulitis and patient afebrile. Plan for labs, US LLE, dispo.

## 2019-10-18 NOTE — ED PROVIDER NOTE - OBJECTIVE STATEMENT
74 year old male PMH afib, MR p/w aide c/o leg swelling. Aide states swelling of left leg noted 2 days ago. PCP concerned for DVT and sent patient to be evaluated. Currently on Xarelto. Patient denies any complaints. Hx limited due to patient's capacity.

## 2020-01-27 ENCOUNTER — APPOINTMENT (OUTPATIENT)
Dept: VASCULAR SURGERY | Facility: CLINIC | Age: 75
End: 2020-01-27
Payer: MEDICARE

## 2020-01-27 VITALS
BODY MASS INDEX: 26.68 KG/M2 | DIASTOLIC BLOOD PRESSURE: 81 MMHG | HEART RATE: 62 BPM | TEMPERATURE: 98.1 F | WEIGHT: 170 LBS | SYSTOLIC BLOOD PRESSURE: 125 MMHG | HEIGHT: 67 IN

## 2020-01-27 PROCEDURE — 99213 OFFICE O/P EST LOW 20 MIN: CPT

## 2020-01-27 PROCEDURE — 93970 EXTREMITY STUDY: CPT

## 2020-01-27 NOTE — ASSESSMENT
[FreeTextEntry1] : Continue compression therapy.\par \par Take Ibuprofen PRN for any left leg pain, inflammation.\par \par Follow up in one year.\par \par Anticoagulation therapy deferred to Hematology.

## 2020-01-27 NOTE — HISTORY OF PRESENT ILLNESS
[FreeTextEntry1] : I have just had the pleasure of seeing Mr. Ever Guthrie ( 41) in follow up for chronic left lower extremity edema.\par \par Mr. Guthrie is a 74-year-old gentleman with a history of developmental delay who is accompanied by his aide today. He resides in a group home. History is obtained from the patient, aide, and EMR. \par \par Mr. Guthrie presented to the [facility] ED in July of last year with a one week history of leg edema. A venous duplex demonstrated a left posterior tibial vein DVT. He was on Predaxa for atrial fibrillation at the time, which was switched to Xarelto by his cardiologist Dr. Bergeron. Since his last visit, the Xarelto was stopped (by either [facility] Cardiologist or Hematologist). He now takes Aspirin only. He continues to experience some left leg edema. There is no known history of skin pigmentation changes, open or healed ulcers, lower extremity claudication, rest pain, or tissue loss. He wears compression stockings as instructed.\par \par His medical and surgical history is otherwise significant for seizure disorder, bilateral cataract surgery, right inguinal hernia repair, atrial fibrillation, and BPH\par \par Medications: Lipitor, Digoxin, Diltiazem, Dutasteride, Phenytoin, Flomax, Aspirin, Zonisamide\par \par Allergies: NKDA\par \par Social history: Non-smoker\par \par FH: NC\par

## 2020-01-27 NOTE — PHYSICAL EXAM
[de-identified] : On physical examination the patient is NAD, NCAT. HEENT- WNL. The lungs are clear and the heart has an irregular rate and rhythm. The abdomen is soft, without tenderness or pulsatile mass. Non-pitting left leg edema. No open wounds. 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

## 2021-02-22 ENCOUNTER — APPOINTMENT (OUTPATIENT)
Dept: VASCULAR SURGERY | Facility: CLINIC | Age: 76
End: 2021-02-22
Payer: MEDICARE

## 2021-02-22 VITALS
TEMPERATURE: 97.7 F | DIASTOLIC BLOOD PRESSURE: 63 MMHG | WEIGHT: 161 LBS | BODY MASS INDEX: 25.27 KG/M2 | SYSTOLIC BLOOD PRESSURE: 98 MMHG | HEART RATE: 55 BPM | HEIGHT: 67 IN

## 2021-02-22 PROCEDURE — 99213 OFFICE O/P EST LOW 20 MIN: CPT

## 2021-02-22 PROCEDURE — 93970 EXTREMITY STUDY: CPT

## 2021-02-22 NOTE — PHYSICAL EXAM
[de-identified] : On physical examination the patient is NAD, NCAT. HEENT- WNL. The lungs are clear and the heart has an irregular rate and rhythm. The abdomen is soft, without tenderness or pulsatile mass. Non-pitting left leg edema. No open wounds. 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

## 2021-02-22 NOTE — HISTORY OF PRESENT ILLNESS
[FreeTextEntry1] : I have just had the pleasure of following up with Mr. Ever Guthrie ( 41) for chronic left lower extremity edema.\par \par Mr. Guthrie is a 75-year-old gentleman with a history of developmental delay who is accompanied by his aide today. He resides in a group home. History is obtained from the patient, aide, and EMR. \par \par Mr. Guthrie previously presented to the [facility] ED with left leg edema. A venous duplex demonstrated a left posterior tibial vein DVT. He was on Predaxa for atrial fibrillation at the time, which was switched to Xarelto by his cardiologist Dr. Bergeron. During his last follow up, there was no evidence of DVT. A duplex showed a chronic left SSV SVT. The Xarelto was subsequently stopped (by either [facility] Cardiologist or Hematologist). He now takes Aspirin only. He wears zippered compression stockings daily. He is without complaints. He was recently immunized for COVID19.\par \par His medical and surgical history is otherwise significant for seizure disorder, bilateral cataract surgery, right inguinal hernia repair, atrial fibrillation, and BPH\par \par Medications: Lipitor, Digoxin, Diltiazem, Dutasteride, Phenytoin, Flomax, Aspirin, Zonisamide\par \par Allergies: NKDA\par \par Social history: Non-smoker\par \par FH: NC\par

## 2021-02-22 NOTE — ASSESSMENT
[FreeTextEntry1] : Mr. Guthrie should continue to wear compression stockings daily, and remove at night.\par He will follow up in two years.

## 2021-04-14 NOTE — ED PROVIDER NOTE - CARE PLAN
Pt. brought in by father complaining of LLQ abdominal pain that began 1 hour ago.  Pt. states she was asleep and pain woke her up.  Pt. denies urinary abnormalities.  Pt. attempted to have BM but states "I didn't feel like I had to go".  Pt. denies nausea or vomiting.  No pmh. Principal Discharge DX:	Fall, initial encounter  Secondary Diagnosis:	Open fracture of nasal bone, initial encounter  Secondary Diagnosis:	Facial laceration, initial encounter

## 2021-10-06 DIAGNOSIS — Z01.818 ENCOUNTER FOR OTHER PREPROCEDURAL EXAMINATION: ICD-10-CM

## 2021-10-08 ENCOUNTER — APPOINTMENT (OUTPATIENT)
Dept: DISASTER EMERGENCY | Facility: CLINIC | Age: 76
End: 2021-10-08

## 2021-10-09 LAB — SARS-COV-2 N GENE NPH QL NAA+PROBE: NOT DETECTED

## 2021-10-24 ENCOUNTER — TRANSCRIPTION ENCOUNTER (OUTPATIENT)
Age: 76
End: 2021-10-24

## 2022-12-14 NOTE — ED ADULT NURSE NOTE - NSINTERVENTIONOPT_GEN_ALL_ED
----- Message from Lindsey Barry sent at 12/14/2022  1:23 PM CST -----  Regarding: call back  Contact: [phone]  Who Called: PT     Type:  Needs Medical Advice    Who Called: PT   Symptoms (please be specific): Left top of the leg pain   How long has patient had these symptoms:  1 week   Pharmacy name and phone #:  [id number] - [place] - [address] AT [facility] [address] Phone:[phone] Fax:  [phone]  Would the patient rather a call back or a response via [facility]? Call back   Best Call Back Number:  [phone]  Additional Information: Patient would like to know what pain medication can she take additional of what was called in. Please advice         
Pt advised that medication should help with pain and inflammation give a call back after a few days if no improvement   
Hourly Rounding

## 2023-02-27 ENCOUNTER — APPOINTMENT (OUTPATIENT)
Dept: VASCULAR SURGERY | Facility: CLINIC | Age: 78
End: 2023-02-27

## 2023-03-27 ENCOUNTER — APPOINTMENT (OUTPATIENT)
Dept: VASCULAR SURGERY | Facility: CLINIC | Age: 78
End: 2023-03-27

## 2023-04-17 ENCOUNTER — APPOINTMENT (OUTPATIENT)
Dept: VASCULAR SURGERY | Facility: CLINIC | Age: 78
End: 2023-04-17
Payer: MEDICARE

## 2023-04-17 VITALS
HEART RATE: 60 BPM | BODY MASS INDEX: 25.11 KG/M2 | WEIGHT: 160 LBS | SYSTOLIC BLOOD PRESSURE: 113 MMHG | TEMPERATURE: 97.6 F | DIASTOLIC BLOOD PRESSURE: 70 MMHG | HEIGHT: 67 IN

## 2023-04-17 PROCEDURE — 99213 OFFICE O/P EST LOW 20 MIN: CPT

## 2023-04-17 PROCEDURE — 93970 EXTREMITY STUDY: CPT

## 2023-04-17 NOTE — PHYSICAL EXAM
[de-identified] : On physical examination the patient is NAD, NCAT. HEENT- WNL. The lungs are clear and the heart has an irregular rate and rhythm. The abdomen is soft, without tenderness or pulsatile mass. Non-pitting left leg edema. No open wounds. Decreased pedal pulses.\par \par

## 2023-04-17 NOTE — HISTORY OF PRESENT ILLNESS
[FreeTextEntry1] : I have just had the pleasure of following up with Mr. Ever Guthrie ( 41) for chronic left lower extremity edema.\par \par Mr. Guthrie is a 75-year-old gentleman with a history of developmental delay who is accompanied by his aide today. He resides in a group home. History is obtained from the patient, aide, and EMR. \par \par Mr. Guthrie previously presented to the [facility] ED with left leg edema. A venous duplex demonstrated a left posterior tibial vein DVT. He was on Predaxa for atrial fibrillation at the time, which was switched to Xarelto by his cardiologist Dr. Bergeron. During his last follow up, there was no evidence of DVT. A duplex showed a chronic left SSV SVT. The Xarelto was subsequently stopped (by either [facility] Cardiologist or Hematologist). He now takes Aspirin only. He wears zippered compression stockings daily. He is without complaints. He was recently immunized for COVID19.\par \par His medical and surgical history is otherwise significant for seizure disorder, bilateral cataract surgery, right inguinal hernia repair, atrial fibrillation, and BPH\par \par Medications: Lipitor, Digoxin, Diltiazem, Dutasteride, Phenytoin, Flomax, Aspirin, Zonisamide\par \par Allergies: NKDA\par \par Social history: Non-smoker\par \par FH: NC\par  [de-identified] : Since his last visit, Mr. Guthrie is doing well. He continues to wear compression stockings daily as instructed. He has no lower extremity wounds. He is ambulating without difficulty. He presents today for surveillance imaging of his lower extremities.

## 2023-04-17 NOTE — ASSESSMENT
[FreeTextEntry1] : In summary, Mr. Guthrie presents with chronic leg edema. A venous duplex was performed today and showed no acute DVT or SVT in either extremity. There is chronic superficial thrombus in the left small saphenous vein (stable finding).\par \par I provided him a new rx for knee high 20-30mmHg compression stockings and instructed him to wear these daily and remove at night. He will follow up with me with repeat test in two years.

## 2023-10-11 ENCOUNTER — OFFICE (OUTPATIENT)
Dept: URBAN - METROPOLITAN AREA CLINIC 35 | Facility: CLINIC | Age: 78
Setting detail: OPHTHALMOLOGY
End: 2023-10-11
Payer: MEDICARE

## 2023-10-11 ENCOUNTER — RX ONLY (RX ONLY)
Age: 78
End: 2023-10-11

## 2023-10-11 DIAGNOSIS — Z96.1: ICD-10-CM

## 2023-10-11 DIAGNOSIS — H35.3131: ICD-10-CM

## 2023-10-11 DIAGNOSIS — H50.15: ICD-10-CM

## 2023-10-11 DIAGNOSIS — H50.89: ICD-10-CM

## 2023-10-11 DIAGNOSIS — H26.491: ICD-10-CM

## 2023-10-11 PROBLEM — H52.7 REFRACTIVE ERROR: Status: ACTIVE | Noted: 2023-10-11

## 2023-10-11 PROCEDURE — 92004 COMPRE OPH EXAM NEW PT 1/>: CPT | Performed by: OPHTHALMOLOGY

## 2023-10-11 ASSESSMENT — SPHEQUIV_DERIVED
OD_SPHEQUIV: -2
OS_SPHEQUIV: -1.375
OD_SPHEQUIV: -0.25
OS_SPHEQUIV: -2.875

## 2023-10-11 ASSESSMENT — KERATOMETRY
OS_K1POWER_DIOPTERS: 47.25
OD_K1POWER_DIOPTERS: 47.25
OS_AXISANGLE_DEGREES: 089
OD_K2POWER_DIOPTERS: 48.00
OD_AXISANGLE_DEGREES: 095
OS_K2POWER_DIOPTERS: 48.00

## 2023-10-11 ASSESSMENT — REFRACTION_MANIFEST
OS_CYLINDER: +0.25
OD_AXIS: 010
OS_AXIS: 060
OS_VA1: 20/80-
OD_VA1: 20/30+
OD_SPHERE: -0.50
OD_CYLINDER: +0.50
OS_SPHERE: -1.50

## 2023-10-11 ASSESSMENT — REFRACTION_AUTOREFRACTION
OD_AXIS: 059
OS_SPHERE: -3.50
OS_AXIS: 063
OS_CYLINDER: +1.25
OD_SPHERE: -2.25
OD_CYLINDER: +0.50

## 2023-10-11 ASSESSMENT — AXIALLENGTH_DERIVED
OD_AL: 22.8782
OD_AL: 22.2545
OS_AL: 22.6515
OS_AL: 23.2034

## 2023-10-11 ASSESSMENT — CONFRONTATIONAL VISUAL FIELD TEST (CVF)
OD_FINDINGS: FULL
OS_FINDINGS: FULL

## 2023-10-11 ASSESSMENT — VISUAL ACUITY
OD_BCVA: 20/100
OS_BCVA: 20/40

## 2024-04-26 ENCOUNTER — NON-APPOINTMENT (OUTPATIENT)
Age: 79
End: 2024-04-26

## 2024-05-20 ENCOUNTER — NON-APPOINTMENT (OUTPATIENT)
Age: 79
End: 2024-05-20

## 2024-05-20 ENCOUNTER — OFFICE (OUTPATIENT)
Dept: URBAN - METROPOLITAN AREA CLINIC 35 | Facility: CLINIC | Age: 79
Setting detail: OPHTHALMOLOGY
End: 2024-05-20
Payer: MEDICARE

## 2024-05-20 DIAGNOSIS — L84 CORNS AND CALLOSITIES: ICD-10-CM

## 2024-05-20 DIAGNOSIS — M79.674 PAIN IN RIGHT TOE(S): ICD-10-CM

## 2024-05-20 DIAGNOSIS — H50.15: ICD-10-CM

## 2024-05-20 DIAGNOSIS — M79.671 PAIN IN RIGHT FOOT: ICD-10-CM

## 2024-05-20 DIAGNOSIS — H52.4: ICD-10-CM

## 2024-05-20 DIAGNOSIS — H50.89: ICD-10-CM

## 2024-05-20 DIAGNOSIS — L60.8 OTHER NAIL DISORDERS: ICD-10-CM

## 2024-05-20 DIAGNOSIS — M79.675 PAIN IN RIGHT TOE(S): ICD-10-CM

## 2024-05-20 DIAGNOSIS — I70.209 UNSPECIFIED ATHEROSCLEROSIS OF NATIVE ARTERIES OF EXTREMITIES, UNSPECIFIED EXTREMITY: ICD-10-CM

## 2024-05-20 DIAGNOSIS — H52.7: ICD-10-CM

## 2024-05-20 DIAGNOSIS — H35.3131: ICD-10-CM

## 2024-05-20 DIAGNOSIS — B35.1 TINEA UNGUIUM: ICD-10-CM

## 2024-05-20 DIAGNOSIS — M79.672 PAIN IN RIGHT FOOT: ICD-10-CM

## 2024-05-20 PROCEDURE — 92015 DETERMINE REFRACTIVE STATE: CPT | Performed by: OPHTHALMOLOGY

## 2024-05-20 PROCEDURE — 99213 OFFICE O/P EST LOW 20 MIN: CPT | Performed by: OPHTHALMOLOGY

## 2024-06-13 ENCOUNTER — NON-APPOINTMENT (OUTPATIENT)
Age: 79
End: 2024-06-13

## 2024-06-17 ENCOUNTER — APPOINTMENT (OUTPATIENT)
Dept: VASCULAR SURGERY | Facility: CLINIC | Age: 79
End: 2024-06-17

## 2024-08-23 NOTE — ED PROVIDER NOTE - CCCP TRG CHIEF CMPLNT
Nish Brar is a 52 year old male presenting to the walk-in clinic today alone for inflammation of the scrotum.  Noticed Monday.  Area is painful.    Treatment tried prior to visit: None    Swabs/Specimens collected during rooming process:  None    Work, School or  note needed: No    New vs Established: New    Patient would like communication of their results via:      Home Phone: [phone] (home)  Okay to leave a message containing results? Yes    
leg swelling

## 2024-09-20 ENCOUNTER — NON-APPOINTMENT (OUTPATIENT)
Age: 79
End: 2024-09-20

## 2024-11-14 ENCOUNTER — DOCTOR'S OFFICE (OUTPATIENT)
Facility: LOCATION | Age: 79
Setting detail: OPHTHALMOLOGY
End: 2024-11-14
Payer: MEDICARE

## 2024-11-14 DIAGNOSIS — H26.493: ICD-10-CM

## 2024-11-14 DIAGNOSIS — Z96.1: ICD-10-CM

## 2024-11-14 DIAGNOSIS — H50.15: ICD-10-CM

## 2024-11-14 DIAGNOSIS — H35.3131: ICD-10-CM

## 2024-11-14 DIAGNOSIS — H50.89: ICD-10-CM

## 2024-11-14 PROCEDURE — 92014 COMPRE OPH EXAM EST PT 1/>: CPT | Performed by: OPHTHALMOLOGY

## 2024-11-14 ASSESSMENT — KERATOMETRY
OS_K2POWER_DIOPTERS: 48.75
OD_K1POWER_DIOPTERS: 47.50
OD_AXISANGLE_DEGREES: 135
OS_AXISANGLE_DEGREES: 079
OD_K2POWER_DIOPTERS: 47.75
OS_K1POWER_DIOPTERS: 47.75

## 2024-11-14 ASSESSMENT — VISUAL ACUITY
OD_BCVA: 20/50-2
OS_BCVA: 20/40

## 2024-11-14 ASSESSMENT — CONFRONTATIONAL VISUAL FIELD TEST (CVF)
OS_FINDINGS: FULL
OD_FINDINGS: FULL

## 2024-11-14 ASSESSMENT — REFRACTION_MANIFEST
OS_ADD: +3.25
OS_SPHERE: -2.00
OS_AXIS: 045
OD_ADD: +3.25
OD_CYLINDER: +0.75
OD_VA1: 20/40
OS_CYLINDER: +0.25
OS_VA1: 20/80-
OD_CYLINDER: +0.50
OS_VA1: 20/50
OS_VA1: 20/40+
OD_AXIS: 010
OD_VA1: 20/30+
OD_VA1: 20/40+2
OS_AXIS: 060
OD_AXIS: 015
OS_SPHERE: -2.25
OS_AXIS: 070
OS_CYLINDER: +0.75
OS_SPHERE: -1.50
OD_SPHERE: -1.25
OD_SPHERE: -1.50
OD_AXIS: 180
OD_CYLINDER: +0.50
OS_CYLINDER: +0.75
OD_SPHERE: -0.50

## 2024-11-14 ASSESSMENT — REFRACTION_AUTOREFRACTION
OS_CYLINDER: +0.75
OD_SPHERE: -2.00
OS_AXIS: 054
OD_AXIS: 005
OD_CYLINDER: +1.00
OS_SPHERE: -2.50

## 2024-11-14 ASSESSMENT — REFRACTION_CURRENTRX
OS_SPHERE: +1.75
OD_CYLINDER: +0.50
OD_SPHERE: +1.50
OS_OVR_VA: 20/
OS_AXIS: 016
OD_OVR_VA: 20/
OD_AXIS: 149
OS_CYLINDER: +0.50

## 2025-06-03 NOTE — ASU PATIENT PROFILE, ADULT - TEACHING/LEARNING FACTORS IMPACT ABILITY TO LEARN
If an upper endoscopy or enteroscopy was performed, you might have a sore throat for 1 to 2 days after the procedure. If it does not improve, please contact your doctor. learning disabilities

## 2025-07-09 ENCOUNTER — NON-APPOINTMENT (OUTPATIENT)
Age: 80
End: 2025-07-09

## 2025-08-01 ENCOUNTER — NON-APPOINTMENT (OUTPATIENT)
Age: 80
End: 2025-08-01